# Patient Record
Sex: FEMALE
[De-identification: names, ages, dates, MRNs, and addresses within clinical notes are randomized per-mention and may not be internally consistent; named-entity substitution may affect disease eponyms.]

---

## 2019-10-29 PROBLEM — Z00.00 ENCOUNTER FOR PREVENTIVE HEALTH EXAMINATION: Status: ACTIVE | Noted: 2019-10-29

## 2019-11-11 ENCOUNTER — APPOINTMENT (OUTPATIENT)
Dept: NEUROSURGERY | Facility: CLINIC | Age: 69
End: 2019-11-11
Payer: MEDICARE

## 2019-11-11 VITALS — BODY MASS INDEX: 29.73 KG/M2 | WEIGHT: 185 LBS | HEIGHT: 66 IN

## 2019-11-11 DIAGNOSIS — G56.03 CARPAL TUNNEL SYNDROM,BILATERAL UPPER LIMBS: ICD-10-CM

## 2019-11-11 DIAGNOSIS — Z78.9 OTHER SPECIFIED HEALTH STATUS: ICD-10-CM

## 2019-11-11 DIAGNOSIS — M54.9 DORSALGIA, UNSPECIFIED: ICD-10-CM

## 2019-11-11 DIAGNOSIS — I10 ESSENTIAL (PRIMARY) HYPERTENSION: ICD-10-CM

## 2019-11-11 PROCEDURE — 99202 OFFICE O/P NEW SF 15 MIN: CPT

## 2019-11-11 RX ORDER — VALSARTAN 160 MG/1
160 TABLET ORAL
Refills: 0 | Status: ACTIVE | COMMUNITY

## 2019-11-11 RX ORDER — METOPROLOL SUCCINATE 50 MG/1
50 TABLET, EXTENDED RELEASE ORAL
Refills: 0 | Status: ACTIVE | COMMUNITY

## 2019-11-11 NOTE — HISTORY OF PRESENT ILLNESS
[FreeTextEntry1] : CC:  bilateral hand numbness\par \par HPI:  69 year-old female with bilateral hand numbness.  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  No significant neck pain, no radiculopathy.  She does feel she is dropping things.\par \par No imaging\par No EMG

## 2019-11-11 NOTE — ASSESSMENT
[FreeTextEntry1] : Suspect CTS.  Recommend EMG in both upper extremities.\par I will call her with EMG results\par Ordered splints and occupational therapy.

## 2019-11-11 NOTE — PHYSICAL EXAM
[FreeTextEntry1] : Physical exam: \par \par Vital Signs - stable\par \par General - well appearing in no acute distress\par \par HEENT-normocephalic atraumatic\par \par Skin-intact\par \par CV-no edema, good distal pulses, radial and DP\par \par Lungs-no wheezing or cyanosis\par \par Musculoskeletal- no significant decreased ROM; no tenderness to palpation; negative Spurling’s; negative Lhermittes; negative straight leg raise; negative FABERs; positive Tinels over wrist\par \par Neuro- awake, alert, and oriented; CN II-XII intact; 5/5 strength; sensation diminished in first 3 digits of both hands; reflexes normal; no Hoffmans or clonus, gait intact\par \par \par

## 2022-04-12 ENCOUNTER — APPOINTMENT (OUTPATIENT)
Dept: OTOLARYNGOLOGY | Facility: CLINIC | Age: 72
End: 2022-04-12